# Patient Record
Sex: MALE | Race: WHITE | NOT HISPANIC OR LATINO | Employment: FULL TIME | URBAN - METROPOLITAN AREA
[De-identification: names, ages, dates, MRNs, and addresses within clinical notes are randomized per-mention and may not be internally consistent; named-entity substitution may affect disease eponyms.]

---

## 2020-01-20 ENCOUNTER — OFFICE VISIT - RIVER FALLS (OUTPATIENT)
Dept: FAMILY MEDICINE | Facility: CLINIC | Age: 45
End: 2020-01-20

## 2020-01-20 ASSESSMENT — MIFFLIN-ST. JEOR: SCORE: 1610.19

## 2020-01-28 ENCOUNTER — OFFICE VISIT - RIVER FALLS (OUTPATIENT)
Dept: FAMILY MEDICINE | Facility: CLINIC | Age: 45
End: 2020-01-28

## 2020-01-28 ASSESSMENT — MIFFLIN-ST. JEOR: SCORE: 1611.1

## 2020-06-23 ENCOUNTER — OFFICE VISIT - RIVER FALLS (OUTPATIENT)
Dept: FAMILY MEDICINE | Facility: CLINIC | Age: 45
End: 2020-06-23

## 2020-06-23 ASSESSMENT — MIFFLIN-ST. JEOR: SCORE: 1616.54

## 2021-02-18 ENCOUNTER — AMBULATORY - RIVER FALLS (OUTPATIENT)
Dept: FAMILY MEDICINE | Facility: CLINIC | Age: 46
End: 2021-02-18

## 2021-02-22 LAB — SARS-COV-2 RNA RESP QL NAA+PROBE: POSITIVE

## 2021-03-03 ENCOUNTER — OFFICE VISIT - RIVER FALLS (OUTPATIENT)
Dept: FAMILY MEDICINE | Facility: CLINIC | Age: 46
End: 2021-03-03

## 2021-03-03 ASSESSMENT — MIFFLIN-ST. JEOR: SCORE: 1611.79

## 2021-03-04 ENCOUNTER — AMBULATORY - RIVER FALLS (OUTPATIENT)
Dept: FAMILY MEDICINE | Facility: CLINIC | Age: 46
End: 2021-03-04

## 2021-03-04 LAB
BUN SERPL-MCNC: 15 MG/DL (ref 7–25)
BUN/CREAT RATIO - HISTORICAL: ABNORMAL (ref 6–22)
CALCIUM SERPL-MCNC: 9.6 MG/DL (ref 8.6–10.3)
CHLORIDE BLD-SCNC: 105 MMOL/L (ref 98–110)
CHOLEST SERPL-MCNC: 262 MG/DL
CHOLEST/HDLC SERPL: 6.1 {RATIO}
CO2 SERPL-SCNC: 24 MMOL/L (ref 20–32)
CREAT SERPL-MCNC: 1.18 MG/DL (ref 0.6–1.35)
EGFRCR SERPLBLD CKD-EPI 2021: 74 ML/MIN/1.73M2
GLUCOSE BLD-MCNC: 107 MG/DL (ref 65–99)
HDLC SERPL-MCNC: 43 MG/DL
LDLC SERPL CALC-MCNC: ABNORMAL MG/DL
NONHDLC SERPL-MCNC: 219 MG/DL
POTASSIUM BLD-SCNC: 3.9 MMOL/L (ref 3.5–5.3)
SODIUM SERPL-SCNC: 138 MMOL/L (ref 135–146)
TRIGL SERPL-MCNC: 418 MG/DL

## 2021-03-05 ENCOUNTER — COMMUNICATION - RIVER FALLS (OUTPATIENT)
Dept: FAMILY MEDICINE | Facility: CLINIC | Age: 46
End: 2021-03-05

## 2021-03-05 LAB
ERYTHROCYTE [DISTWIDTH] IN BLOOD BY AUTOMATED COUNT: 12.7 % (ref 11–15)
HCT VFR BLD AUTO: 38.4 % (ref 38.5–50)
HGB BLD-MCNC: 13.4 GM/DL (ref 13.2–17.1)
MCH RBC QN AUTO: 29.3 PG (ref 27–33)
MCHC RBC AUTO-ENTMCNC: 34.9 GM/DL (ref 32–36)
MCV RBC AUTO: 83.8 FL (ref 80–100)
PLATELET # BLD AUTO: 287 10*3/UL (ref 140–400)
PMV BLD: 11.2 FL (ref 7.5–12.5)
RBC # BLD AUTO: 4.58 10*6/UL (ref 4.2–5.8)
WBC # BLD AUTO: 7.3 10*3/UL (ref 3.8–10.8)

## 2021-07-29 ENCOUNTER — AMBULATORY - RIVER FALLS (OUTPATIENT)
Dept: FAMILY MEDICINE | Facility: CLINIC | Age: 46
End: 2021-07-29

## 2021-08-26 ENCOUNTER — AMBULATORY - RIVER FALLS (OUTPATIENT)
Dept: FAMILY MEDICINE | Facility: CLINIC | Age: 46
End: 2021-08-26

## 2022-02-11 VITALS
BODY MASS INDEX: 28.12 KG/M2 | DIASTOLIC BLOOD PRESSURE: 72 MMHG | WEIGHT: 175 LBS | HEART RATE: 66 BPM | SYSTOLIC BLOOD PRESSURE: 132 MMHG | HEIGHT: 66 IN

## 2022-02-12 VITALS
DIASTOLIC BLOOD PRESSURE: 98 MMHG | HEART RATE: 91 BPM | DIASTOLIC BLOOD PRESSURE: 60 MMHG | WEIGHT: 173.8 LBS | HEIGHT: 66 IN | HEIGHT: 66 IN | SYSTOLIC BLOOD PRESSURE: 112 MMHG | BODY MASS INDEX: 27.9 KG/M2 | WEIGHT: 173.6 LBS | HEART RATE: 74 BPM | BODY MASS INDEX: 27.93 KG/M2 | SYSTOLIC BLOOD PRESSURE: 150 MMHG | OXYGEN SATURATION: 98 %

## 2022-02-12 VITALS
DIASTOLIC BLOOD PRESSURE: 84 MMHG | SYSTOLIC BLOOD PRESSURE: 138 MMHG | HEIGHT: 65 IN | BODY MASS INDEX: 29.85 KG/M2 | OXYGEN SATURATION: 99 % | RESPIRATION RATE: 16 BRPM | WEIGHT: 179.2 LBS | HEART RATE: 83 BPM

## 2022-02-15 NOTE — TELEPHONE ENCOUNTER
---------------------  From: Adelia Rogers   To: Mau Salvador PA-C; Phone Messages Pool ([id number]_[place] - [place]);     Sent: 2/20/2021 10:42:25 AM CST  Subject: Result: COVID-19 (POSITIVE)     Left message for patient at: 10:41am, regarding COVID-19 test.  Result is POSITIVE.LMTCB at 9:29amCall to patient. Advised his result for Covid is positive. He will need to follow isolation precautions and he will be contacted by his public health office. He tells me he did just get off the phone with public health.

## 2022-02-15 NOTE — TELEPHONE ENCOUNTER
---------------------  From: Mau Salvador PA-C   To: Care Coordinators Pool ([facility]);     Sent: 2/18/2021 11:00:42 AM CST  Subject: Soraya     Please put on today's C-19 testing schedule

## 2022-02-15 NOTE — NURSING NOTE
Comprehensive Intake Entered On:  1/28/2020 5:01 PM CST    Performed On:  1/28/2020 4:58 PM CST by Yu Alvarado CMA               Summary   Chief Complaint :   low back pain; back spasms started last night; Chiropactior did help some;    Menstrual Status :   N/A   Weight Measured :   173.8 lb(Converted to: 173 lb 13 oz, 78.83 kg)    Height Measured :   66 in(Converted to: 5 ft 6 in, 167.64 cm)    Body Mass Index :   28.05 kg/m2 (HI)    Body Surface Area :   1.91 m2   Systolic Blood Pressure :   112 mmHg   Diastolic Blood Pressure :   60 mmHg   Mean Arterial Pressure :   77 mmHg   Peripheral Pulse Rate :   91 bpm   BP Method :   Manual   HR Method :   Electronic   Yu Alvarado CMA - 1/28/2020 4:58 PM CST   Health Status   Allergies Verified? :   Yes   Medication History Verified? :   Yes   Immunizations Current :   Unknown   Medical History Verified? :   Yes   Pre-Visit Planning Status :   Completed   Tobacco Use? :   Former smoker   Yu Alvarado CMA - 1/28/2020 4:58 PM CST   Consents   Consent for Immunization Exchange :   Consent Granted   Consent for Immunizations to Providers :   Consent Granted   Yu Alvarado CMA - 1/28/2020 4:58 PM CST   Meds / Allergies   (As Of: 1/28/2020 5:01:27 PM CST)   Allergies (Active)   No Known Medication Allergies  Estimated Onset Date:   Unspecified ; Created By:   Jennyfer Stephenson CMA; Reaction Status:   Active ; Category:   Drug ; Substance:   No Known Medication Allergies ; Type:   Allergy ; Updated By:   Jennyfer Stephenson CMA; Reviewed Date:   1/28/2020 5:00 PM CST        Medication List   (As Of: 1/28/2020 5:01:27 PM CST)   Prescription/Discharge Order    cyclobenzaprine  :   cyclobenzaprine ; Status:   Processing ; Ordered As Mnemonic:   cyclobenzaprine 10 mg oral tablet ; Ordering Provider:   Mau Salvador PA-C; Action Display:   Complete ; Catalog Code:   cyclobenzaprine ; Order Dt/Tm:   1/28/2020 5:00:15 PM CST

## 2022-02-15 NOTE — PROGRESS NOTES
Chief Complaint    low back pain; back spasms started last night; Chiropactior did help some;  History of Present Illness      patient with back pain x 2 weeks      started after episode of prolonged standing and then having to shovel      had seemed to be improving but now worsening again      seeing chiropractor daily since this started      pain noted to be spasm, bilateral lower back, radiates up toward thoracic/midback      no leg symptoms, no bowel or bladder issues, no radiation         Physical Exam   Vitals & Measurements    HR: 91(Peripheral)  BP: 112/60     HT: 66 in  WT: 173.8 lb  BMI: 28.05       patient is alert and oriented, cooperative, in no distress      heent: normocephalic, oral mucosa moist      neck: supple      MSK: no spine tenderness, pain in lateral low back bilaterally, no deformity      normal reflexes in lower extremities  Assessment/Plan       Lumbago (M54.5)         Patient has symptoms consistent with musculoskeletal back pain. Doubt fracture. No evidence of any high risk symptoms. Discussed options for treatment including NSAIDs, ice, heat, muscle relaxers and steroids. Also can consider PT or massage.  Will continue chiropractic care. Off work this week.         Ordered:          cyclobenzaprine, = 1 tab(s) ( 10 mg ), Oral, tid, PRN: for spasm, # 30 tab(s), 0 Refill(s), Type: Acute, Pharmacy: [facility] #[id number], 1 tab(s) Oral tid,PRN:for spasm, (Ordered)          predniSONE, = 2 tab(s) ( 40 mg ), Oral, daily, x 5 day(s), # 10 tab(s), 0 Refill(s), Type: Acute, Pharmacy: [facility] #[id number], 2 tab(s) Oral daily,x5 day(s), (Ordered)           Patient Information     Name:DAISY HELLER      Address:      [address] [place] [id number]     Sex:Male     YOB: 1975     [phone]     Emergency Contact:[facility], CONTACT     MRN:[id number]     FIN:[id number]     Location:[facility]     Date of  Service:01/28/2020      Primary Care Physician:       Marta Sanz MD, [phone]      Attending Physician:       Marta Sanz MD, [phone]  Problem List/Past Medical History    Ongoing     No qualifying data    Historical     No qualifying data  Procedure/Surgical History     L knee surgery (2011)     R knee surgery (1993)  Medications    cyclobenzaprine 10 mg oral tablet, 10 mg= 1 tab(s), Oral, tid, PRN    predniSONE 20 mg oral tablet, 40 mg= 2 tab(s), Oral, daily  Allergies    No Known Medication Allergies  Social History    Smoking Status - 01/28/2020     Former smoker     Alcohol - Current, 01/28/2014      2 x's per week, 4 drinks/episode average., 01/28/2014     Employment/School      Employed, Work/School description: [employer]., 01/03/2014     Exercise - Regular exercise, 01/03/2014      Exercise frequency: 1-2 times/week. Exercise type: Cardio and weights., 01/28/2014     Home/Environment      Marital status:  (Living together). Spouse/Partner name: Alexsandra. Lives with Children, Spouse. 3 children. Living situation: Home/Independent. Smoker in household: No., 11/13/2014     Nutrition/Health      Type of diet: Regular. Caffeine intake amount: Red Bull in am., 11/13/2014     Sexual      Sexually active: Yes. Sexual orientation: Heterosexual. Other contraceptive use: Vasectomy., 01/28/2014     Substance Abuse - Denies Substance Abuse, 01/28/2014      Never, 01/03/2014     Tobacco - Past, 01/28/2014      Past, Total pack years: 10., 01/28/2014  Family History    Diabetes mellitus type 2: Grandmother (M).    Sister: History is negative    Sister: History is negative    Sister: History is negative    Mother: History is negative  Immunizations      Vaccine Date Status          tetanus/diphth/pertuss (Tdap) adult/adol 12/02/2014 Given

## 2022-02-15 NOTE — LETTER
(Inserted Image. Unable to display)   January 21, 2021      MAU HELLER  [address], [place] [id number]        Dear MAU,      Thank you for selecting [facility] (previously [facility]) for your healthcare needs.     Our records indicate you are due for the following services:     Annual Physical    (FYI   Regarding office visits: In some instances, a video visit or telephone visit may be offered as an option.)      To schedule an appointment or if you have further questions, please contact your clinic at [phone].      Powered by [employer]    Sincerely,    Mau Salvador PA-C

## 2022-02-15 NOTE — PROGRESS NOTES
Patient:   DAISY HELLER            MRN: [id number]            FIN: [id number]               Age:   44 years     Sex:  Male     :  1975   Associated Diagnoses:   Low back pain   Author:   Marta Sanz MD      Chief Complaint   2020 3:12 PM CDT    low back pain, chiropactor helps some, no injury      History of Present Illness   patient with low back pain, left more than right  using iburpofen, seeing chiropractor  minimally better  has had recurrent episodes, prednisone was helpful, also used muscle relaxers  no trauma or injury  no weakness, no bowel or bladder symptoms      Health Status   Allergies:    Allergic Reactions (All)  No Known Medication Allergies  Canceled/Inactive Reactions (All)  No known allergies   Medications:    Medications          No Known Home Medications        Histories   Past Medical History:    No active or resolved past medical history items have been selected or recorded.   Family History:    CA - Lung cancer  Father ()  Diabetes mellitus type 2  Grandmother (M)     Procedure history:    L knee surgery in  at 36 Years.  R knee surgery in  at 18 Years.   Social History:        Alcohol Assessment: Current            Beer (12 oz), 3-5 times per week, 2 drinks/episode average.  3 drinks/episode maximum.  Ready to change: No.      Tobacco Assessment: Past            Past, Total pack years: 10.            Quit 2010, Cigarettes, Total pack years: 10.      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      Home and Environment Assessment            Marital status:  (Living together).  Spouse/Partner name: Alexsandra.  Lives with Children, Spouse.  3               children.  Living situation: Home/Independent.  Smoker in household: No.  Injuries/Abuse/Neglect in               household: No.  Feels unsafe at home: No.  Family/Friends  available for support: Yes.      Nutrition and Health Assessment            Type of diet: Regular.  Caffeine intake amount: Red Bull in am.  Wants to lose weight: No.  Sleeping               concerns: No.  Feels highly stressed: No.      Exercise and Physical Activity Assessment: Regular exercise            Exercise frequency: 1-2 times/week.  Exercise type: Cardio and weights.      Sexual Assessment            Sexually active: Yes.  Sexual orientation: Heterosexual.  Other contraceptive use: Vasectomy.            Identifies as male, History of STD: No.  History of sexual abuse: No.        Physical Examination   Vital Signs   6/23/2020 3:12 PM CDT Peripheral Pulse Rate 66 bpm    Systolic Blood Pressure 132 mmHg    Diastolic Blood Pressure 72 mmHg    Mean Arterial Pressure 92 mmHg      Measurements from flowsheet : Measurements   6/23/2020 3:12 PM CDT Height Measured - Standard 66 in    Weight Measured - Standard 175 lb    BSA 1.92 m2    Body Mass Index 28.24 kg/m2  HI      General:  Alert and oriented, No acute distress.    no bony tenderness along lumbar spine, tender in paraspinous muscles         Impression and Plan   Diagnosis     Low back pain ([id number] M54.5).     Course:  Worsening, acute exacerbation, will continue chiropractic care, add prednisone and muscle relaxers, start PT if not improving.    Orders     Orders (Selected)   Outpatient Orders  Ordered  Referral (Request): 06/23/20 15:23:00 CDT, Referred to: Physical Therapy, Low back pain  Prescriptions  Prescribed  cyclobenzaprine 10 mg oral tablet: = 1 tab(s) ( 10 mg ), Oral, tid, PRN: for spasm, # 30 tab(s), 0 Refill(s), Type: Acute, Pharmacy: [facility] #[id number], 1 tab(s) Oral tid,PRN:for spasm, 66, in, 06/23/20 15:12:00 CDT, Height Measured, 175, lb, 06/23/20 15:12:00 CDT, Weight Paulina...  predniSONE 20 mg oral tablet: = 2 tab(s) ( 40 mg ), Oral, daily, x 5 day(s), # 10 tab(s), 0 Refill(s), Type: Acute, Pharmacy: [facility] #[id number],  2 tab(s) Oral daily,x5 day(s), 66, in, 06/23/20 15:12:00 CDT, Height Measured, 175, lb, 06/23/20 15:12:00 CDT, Weight Measured....

## 2022-02-15 NOTE — NURSING NOTE
Comprehensive Intake Entered On:  6/23/2020 3:15 PM CDT    Performed On:  6/23/2020 3:12 PM CDT by Yu Alvarado CMA               Summary   Chief Complaint :   low back pain, chiropactor helps some, no injury    Menstrual Status :   N/A   Weight Measured :   175 lb(Converted to: 175 lb 0 oz, 79.38 kg)    Height Measured :   66 in(Converted to: 5 ft 6 in, 167.64 cm)    Body Mass Index :   28.24 kg/m2 (HI)    Body Surface Area :   1.92 m2   Systolic Blood Pressure :   132 mmHg   Diastolic Blood Pressure :   72 mmHg   Mean Arterial Pressure :   92 mmHg   Peripheral Pulse Rate :   66 bpm   Yu Alvarado CMA - 6/23/2020 3:12 PM CDT   Health Status   Allergies Verified? :   Yes   Medication History Verified? :   Yes   Immunizations Current :   Unknown   Medical History Verified? :   Yes   Pre-Visit Planning Status :   Completed   Tobacco Use? :   Never smoker   Yu Alvarado CMA - 6/23/2020 3:12 PM CDT   Consents   Consent for Immunization Exchange :   Consent Granted   Consent for Immunizations to Providers :   Consent Granted   Yu Alvarado CMA - 6/23/2020 3:12 PM CDT   Meds / Allergies   (As Of: 6/23/2020 3:15:59 PM CDT)   Allergies (Active)   No Known Medication Allergies  Estimated Onset Date:   Unspecified ; Created By:   Jennyfer Stephenson CMA; Reaction Status:   Active ; Category:   Drug ; Substance:   No Known Medication Allergies ; Type:   Allergy ; Updated By:   Jennyfer Stephenson CMA; Reviewed Date:   6/23/2020 3:14 PM CDT        Medication List   (As Of: 6/23/2020 3:15:59 PM CDT)   No Known Home Medications     Yu Alvarado CMA - 6/23/2020 3:14:20 PM           ID Risk Screen   Recent Travel History :   No recent travel   Family Member Travel History :   No recent travel   Other Exposure to Infectious Disease :   Unknown   Yu Alvarado CMA - 6/23/2020 3:12 PM CDT

## 2022-02-15 NOTE — NURSING NOTE
Comprehensive Intake Entered On:  3/3/2021 2:14 PM CST    Performed On:  3/3/2021 2:07 PM CST by Rosa Lawrence               Summary   Chief Complaint :   Physical     Menstrual Status :   N/A   Weight Measured :   179.2 lb(Converted to: 179 lb 3 oz, 81.284 kg)    Height Measured :   64.5 in(Converted to: 5 ft 4 in, 163.83 cm)    Body Mass Index :   30.28 kg/m2 (HI)    Body Surface Area :   1.92 m2   Systolic Blood Pressure :   138 mmHg (HI)    Diastolic Blood Pressure :   84 mmHg (HI)    Mean Arterial Pressure :   102 mmHg   Peripheral Pulse Rate :   83 bpm   Respiratory Rate :   16 br/min   Oxygen Saturation :   99 %   Rosa Lawrence - 3/3/2021 2:07 PM CST   Health Status   Allergies Verified? :   Yes   Medication History Verified? :   Yes   Immunizations Current :   Unknown   Pre-Visit Planning Status :   Completed   Tobacco Use? :   Former smoker   Rosa Lawrence - 3/3/2021 2:07 PM CST   Consents   Consent for Immunization Exchange :   Consent Granted   Consent for Immunizations to Providers :   Consent Granted   Rosa Lawrence - 3/3/2021 2:07 PM CST   Meds / Allergies   (As Of: 3/3/2021 2:14:54 PM CST)   Allergies (Active)   No Known Medication Allergies  Estimated Onset Date:   Unspecified ; Created By:   Jennyfer Stephenson CMA; Reaction Status:   Active ; Category:   Drug ; Substance:   No Known Medication Allergies ; Type:   Allergy ; Updated By:   Jennyfer Stephenson CMA; Reviewed Date:   3/3/2021 2:10 PM CST        Medication List   (As Of: 3/3/2021 2:14:54 PM CST)   No Known Home Medications     Rosa Lawrence - 3/3/2021 2:10:47 PM           ID Risk Screen   Recent Travel History :   No recent travel   Family Member Travel History :   No recent travel   Other Exposure to Infectious Disease :   Unknown   COVID-19 Testing Status :   Positive COVID-19 test in the last 30 days   Rosa Lawrence - 3/3/2021 2:07 PM CST   Social History   Social History   (As Of: 3/3/2021 2:14:54 PM CST)   Alcohol:  Current      Beer  (12 oz), 3-5 times per week, 2 drinks/episode average.  3 drinks/episode maximum.  Ready to change: No.   (Last Updated: 2/19/2020 2:35:21 PM CST by Yari Burleson)          Tobacco:  Past      Former smoker, quit more than 30 days ago   (Last Updated: 3/3/2021 2:08:36 PM CST by Rosa Lawrence)          Electronic Cigarette/Vaping:        Electronic Cigarette Use: Never.   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   (Last Updated: 1/3/2014 3:35:45 PM CST by Angela Curtis LPN)          Home/Environment:        Marital status:  (Living together).  Spouse/Partner name: Alexsandra.  Lives with Children, Spouse.  3 children.  Living situation: Home/Independent.  Smoker in household: No.  Injuries/Abuse/Neglect in household: No.  Feels unsafe at home: No.  Family/Friends available for support: Yes.   (Last Updated: 2/19/2020 2:35:54 PM CST by Yari Burleson)          Nutrition/Health:        Type of diet: Regular.  Caffeine intake amount: Red Bull in am.  Wants to lose weight: No.  Sleeping concerns: No.  Feels highly stressed: No.   (Last Updated: 2/19/2020 2:35:36 PM CST by Yari Burleson)          Exercise:  Regular exercise      Exercise frequency: 1-2 times/week.  Exercise type: Cardio and weights.   (Last Updated: 1/28/2014 12:17:47 PM CST by Yari Burleson)          Sexual:        Sexually active: Yes.  Sexual orientation: Heterosexual.  Other contraceptive use: Vasectomy.   (Last Updated: 1/28/2014 12:17:17 PM CST by Yari Burleson)   Identifies as male, History of STD: No.  History of sexual abuse: No.   (Last Updated: 2/19/2020 2:36:17 PM CST by Yari Burleson)

## 2022-02-15 NOTE — TELEPHONE ENCOUNTER
---------------------  From: Mireille Thompson (Phone Messages Pool ([id number]_[facility]))   To: [facility] Message Pool ([address]_[facility]);     Sent: 2/18/2021 10:24:41 AM CST  Subject: General Message     Phone Message    PCP: CASSANDRA    Time of Call: 0945    Phone Number: [phone]    Returned call at: [phone]    Note: Pt called stated that son tested pos for COVID and he is wanting to get tested for covid as well.    Do you want pt to schedule a visit or ok to order test?---------------------  From: Maribeth Salazar MA ([employer] Message Pool ([id number]_[facility]))   To: Mau Salvador PA-C;     Sent: 2/18/2021 10:26:40 AM CST  Subject: FW: General Message

## 2022-02-15 NOTE — PROGRESS NOTES
Patient:   MAU HELLER            MRN: [id number]            FIN: [id number]               Age:   44 years     Sex:  Male     :  1975   Associated Diagnoses:   Low back pain   Author:   Madeleine DICKINSON, Mau      Report Summary   Diagnosis  Low back pain ([id number]-HA M54.5).  Patient InstructionsOrders   Visit Information      Date of Service: 2020 10:14 am  Performing Location: [facility]  Encounter#: [id number]      Primary Care Provider (PCP):  Marta Sanz MD    NPI# [id number]   Visit type:  Increase in symptoms.    Accompanied by:  Spouse.    Source of history:  Self, Spouse.    Referral source:  Self.    History limitation:  None.       Chief Complaint   2020 10:20 AM CST   New Patient: c/o low back pain since Friday; went to visitiation stood in line for 2 hrs back started to hurt,, has been getting worse; IBU not helping; hx of chronic back pain        History of Present Illness             The patient presents with back pain.  The back pain is located on both sides and lumbar region.  The back pain is described as aching and spasmodic.  The severity of the back pain is moderate.  The back pain is episodic, fluctuates in intensity and is worsening.  The back pain has lasted for 3 day(s).  Radiation of pain: none.  Exacerbating factors consist of movement and walking.  Relieving factors consist of none.  Associated symptoms consist of none.  History of low back pain. Sees chiropractor. Using Advil with no relief. PDMP checked..        Review of Systems   Constitutional:  Negative.    Gastrointestinal:  Negative.    Genitourinary:  Negative.    Musculoskeletal:  Negative except as documented in history of present illness.    Integumentary:  Negative.    Neurologic:  Negative.       Health Status   Allergies:    Allergic Reactions (All)  No Known Medication Allergies  Canceled/Inactive Reactions (All)  No known allergies   Medications:  (Selected)    Prescriptions  Prescribed  cyclobenzaprine 10 mg oral tablet: = 1 tab(s) ( 10 mg ), Oral, tid, PRN: for spasm, # 30 tab(s), 0 Refill(s), Type: Acute, Pharmacy: [facility] #[id number], 1 tab(s) Oral tid,PRN:for spasm  traMADol 50 mg oral tablet: = 1 tab(s) ( 50 mg ), Oral, q4-6 hrs, x 3 day(s), PRN: for pain, # 18 tab(s), 0 Refill(s), Type: Acute, Pharmacy: [facility] #[id number], 1 tab(s) Oral q4-6 hrs,x3 day(s),PRN:for pain   Problem list:    No problem items selected or recorded.      Histories   Past Medical History:    No active or resolved past medical history items have been selected or recorded.   Family History:    Diabetes mellitus type 2  Grandmother (M)     Procedure history:    L knee surgery in 2011 at 36 Years.  R knee surgery in 1993 at 18 Years.   Social History:        Alcohol Assessment: Current            2 x's per week, 4 drinks/episode average.      Tobacco Assessment: Past            Past, Total pack years: 10.                     Comments:                      01/03/2014 - Kirsten SAMPSON, Angela                     Quit 6 yrs ago-cigs.      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      Home and Environment Assessment            Marital status:  (Living together).  Spouse/Partner name: Alexsandra.  Lives with Children, Spouse.  3               children.  Living situation: Home/Independent.  Smoker in household: No.      Nutrition and Health Assessment            Type of diet: Regular.  Caffeine intake amount: Red Bull in am.      Exercise and Physical Activity Assessment: Regular exercise            Exercise frequency: 1-2 times/week.  Exercise type: Cardio and weights.      Sexual Assessment            Sexually active: Yes.  Sexual orientation: Heterosexual.  Other contraceptive use: Vasectomy.        Physical Examination   Vital Signs   1/20/2020  10:20 AM CST Peripheral Pulse Rate 74 bpm    HR Method Electronic    Systolic Blood Pressure 150 mmHg  HI    Diastolic Blood Pressure 98 mmHg  HI    Mean Arterial Pressure 115 mmHg    BP Site Right arm    BP Method Manual    Oxygen Saturation 98 %      Measurements from flowsheet : Measurements   1/20/2020 10:20 AM CST Height Measured - Standard 66 in    Weight Measured - Standard 173.6 lb    BSA 1.91 m2    Body Mass Index 28.02 kg/m2  HI      General:  Alert and oriented, Mild distress.    Respiratory:  Respirations are non-labored.    Cardiovascular:  Normal rate.    Genitourinary:  No costovertebral angle tenderness.    Musculoskeletal:  Normal strength, No swelling, Normal gait.    Integumentary:  No rash.    Neurologic:  Alert, No focal deficits, Normal deep tendon reflexes.    Psychiatric:  Cooperative, Appropriate mood & affect.       Impression and Plan   Diagnosis     Low back pain (GRV21-HK M54.5).     Patient Instructions:       Counseled: Patient, Regarding diagnosis, Regarding medications, Activity, Verbalized understanding.    Orders     Orders (Selected)   Prescriptions  Prescribed  cyclobenzaprine 10 mg oral tablet: = 1 tab(s) ( 10 mg ), Oral, tid, PRN: for spasm, # 30 tab(s), 0 Refill(s), Type: Acute, Pharmacy: [facility] #[id number], 1 tab(s) Oral tid,PRN:for spasm  traMADol 50 mg oral tablet: = 1 tab(s) ( 50 mg ), Oral, q4-6 hrs, x 3 day(s), PRN: for pain, # 18 tab(s), 0 Refill(s), Type: Acute, Pharmacy: [facility] #[id number], 1 tab(s) Oral q4-6 hrs,x3 day(s),PRN:for pain.     Chiropractor. HEP. Light duty. FU in two weeks if no progress, or prior if worse.

## 2022-02-15 NOTE — NURSING NOTE
Comprehensive Intake Entered On:  1/20/2020 10:30 AM CST    Performed On:  1/20/2020 10:20 AM CST by Jennyefr Stephenson CMA               Summary   Chief Complaint :   New Patient: c/o low back pain since Friday; went to visitiation stood in line for 2 hrs back started to hurt,, has been getting worse; IBU not helping; hx of chronic back pain   Weight Measured :   173.6 lb(Converted to: 173 lb 10 oz, 78.74 kg)    Height Measured :   66 in(Converted to: 5 ft 6 in, 167.64 cm)    Body Mass Index :   28.02 kg/m2 (HI)    Body Surface Area :   1.91 m2   Systolic Blood Pressure :   150 mmHg (HI)    Diastolic Blood Pressure :   98 mmHg (HI)    Mean Arterial Pressure :   115 mmHg   Peripheral Pulse Rate :   74 bpm   BP Site :   Right arm   BP Method :   Manual   HR Method :   Electronic   Oxygen Saturation :   98 %   Jennyfer Stephenson CMA - 1/20/2020 10:20 AM CST   Health Status   Allergies Verified? :   Yes   Medication History Verified? :   Yes   Immunizations Current :   Unknown   Medical History Verified? :   Yes   Tobacco Use? :   Former smoker   Jennyfer Stephenson CMA - 1/20/2020 10:20 AM CST   Consents   Consent for Immunization Exchange :   Consent Granted   Consent for Immunizations to Providers :   Consent Granted   Jennyfer Stephenson CMA - 1/20/2020 10:20 AM CST   Meds / Allergies   (As Of: 1/20/2020 10:30:04 AM CST)   Allergies (Active)   No Known Medication Allergies  Estimated Onset Date:   Unspecified ; Created By:   Jennyfer Stephenson CMA; Reaction Status:   Active ; Category:   Drug ; Substance:   No Known Medication Allergies ; Type:   Allergy ; Updated By:   Jennyfer Stephenson CMA; Reviewed Date:   1/20/2020 10:27 AM CST        Medication List   (As Of: 1/20/2020 10:30:04 AM CST)   No Known Home Medications     Jennyfer Stephenson CMA - 1/20/2020 10:28:02 AM           Social History   Social History   (As Of: 1/20/2020 10:30:04 AM CST)   Alcohol:  Current      2 x's per week, 4 drinks/episode average.   (Last Updated: 1/28/2014 12:19:24 PM  CST by Yari Burleson)          Tobacco:  Past      Past, Total pack years: 10.   Comments:  1/3/2014 3:35 PM - Angela Curtis LPN: Quit 6 yrs ago-cigs.   (Last Updated: 1/28/2014 12:18:59 PM CST by Yari Burleson)          Substance Abuse:  Denies Substance Abuse      Never   (Last Updated: 1/3/2014 3:35:30 PM CST by Angela Curtis LPN)          Employment/School:        Employed, Work/School description: Warehouse planner.   (Last Updated: 1/3/2014 3:35:45 PM CST by Angela Curtis LPN)          Home/Environment:        Marital status:  (Living together).  Spouse/Partner name: Alexsandra.  Lives with Children, Spouse.  3 children.  Living situation: Home/Independent.  Smoker in household: No.   (Last Updated: 11/13/2014 3:03:51 PM CST by Tatianna Moseley CMA)          Nutrition/Health:        Type of diet: Regular.  Caffeine intake amount: Red Bull in am.   (Last Updated: 11/13/2014 3:04:05 PM CST by Tatianna Moseley CMA)          Exercise:  Regular exercise      Exercise frequency: 1-2 times/week.  Exercise type: Cardio and weights.   (Last Updated: 1/28/2014 12:17:47 PM CST by Yari Burleson)          Sexual:        Sexually active: Yes.  Sexual orientation: Heterosexual.  Other contraceptive use: Vasectomy.   (Last Updated: 1/28/2014 12:17:17 PM CST by Yari Burleson)

## 2022-02-15 NOTE — TELEPHONE ENCOUNTER
---------------------  From: Maia Pace LPN (Phone Messages Pool ([id number]_[facility]))   To: [facility] Message Pool ([id number]_[facility]);     Sent: 3/19/2021 1:53:18 PM CDT  Subject: labs     Phone Message    PCP:   [facility]      Time of Call:  1:49pm       Person Calling:  pt  Phone number:  [phone]    Note:   Pt calling stating he was in over 2 weeks ago for labs and has not received call with results.  Returned call and informed pt results were sent to portal. Read pt results.    Per results pt suppose to call [facility] to discuss .    Last office visit and reason:  3/3/21 well adult exam- male---------------------  From: Jennyfer Stephenson CMA ([employer] Message Pool ([id number]))   To: Mau Salvador PA-C;     Sent: 3/19/2021 1:57:35 PM CDT  Subject: FW: labs---------------------  From: Mau Salvador PA-C   To: [employer] Message Pool ([id number]_[facility]);     Sent: 3/19/2021 2:14:45 PM CDT  Subject: RE: labs     I did call him. Will work on lifestyle modifications. FU with fasting labs in one year.      [facility]

## 2022-02-15 NOTE — TELEPHONE ENCOUNTER
---------------------  From: Mau Salvador PA-C   To: MAU HELLER    Sent: 3/5/2021 6:55:57 AM CST  Subject: General Message     Your glucose is mildly elevated. Your lipids are more elevated than I would like. Please call me the week of March 15th to discuss in detail.       Results:  Date Result Name Ind Value Ref Range   3/3/2021 2:25 PM Sodium Level  138 mmol/L (135 - 146)   3/3/2021 2:25 PM Potassium Level  3.9 mmol/L (3.5 - 5.3)   3/3/2021 2:25 PM Chloride Level  105 mmol/L (98 - 110)   3/3/2021 2:25 PM CO2 Level  24 mmol/L (20 - 32)   3/3/2021 2:25 PM Glucose Level ((H)) 107 mg/dL (65 - 99)   3/3/2021 2:25 PM BUN  15 mg/dL (7 - 25)   3/3/2021 2:25 PM Creatinine Level  1.18 mg/dL (0.60 - 1.35)   3/3/2021 2:25 PM Calcium Level  9.6 mg/dL (8.6 - 10.3)   3/3/2021 2:25 PM Cholesterol ((H)) 262 mg/dL ( - <200)   3/3/2021 2:25 PM Non-HDL Cholesterol ((H)) 219 ( - <130)   3/3/2021 2:25 PM HDL  43 mg/dL (> OR = 40 - )   3/3/2021 2:25 PM Cholesterol/HDL Ratio ((H)) 6.1 ( - <5.0)   3/3/2021 2:25 PM LDL  See comment    3/3/2021 2:25 PM LDL Direct ((H)) 167 mg/dL ( - <100)   3/3/2021 2:25 PM Triglyceride ((H)) 418 mg/dL ( - <150)   3/4/2021 3:36 PM WBC  7.3 (3.8 - 10.8)   3/4/2021 3:36 PM RBC  4.58 (4.20 - 5.80)   3/4/2021 3:36 PM Hgb  13.4 gm/dL (13.2 - 17.1)   3/4/2021 3:36 PM Hct ((L)) 38.4 % (38.5 - 50.0)   3/4/2021 3:36 PM MCV  83.8 fL (80.0 - 100.0)   3/4/2021 3:36 PM MCH  29.3 pg (27.0 - 33.0)   3/4/2021 3:36 PM MCHC  34.9 gm/dL (32.0 - 36.0)   3/4/2021 3:36 PM RDW  12.7 % (11.0 - 15.0)   3/4/2021 3:36 PM Platelet  287 (140 - 400)   3/4/2021 3:36 PM MPV  11.2 fL (7.5 - 12.5)

## 2022-02-15 NOTE — NURSING NOTE
CAGE Assessment Entered On:  3/3/2021 3:08 PM CST    Performed On:  3/3/2021 3:07 PM CST by Rosa Lawrence               Assessment   Have you ever felt you should cut down on your drinking :   No   Have people annoyed you by criticizing your drinking :   No   Have you ever felt bad or guilty about your drinking :   No   Have you ever taken a drink first thing in the morning to steady your nerves or get rid of a hangover (Eye-opener) :   No   CAGE Score :   0    Rosa Lawrence - 3/3/2021 3:07 PM CST

## 2022-02-15 NOTE — PROGRESS NOTES
Patient:   MAU HELLER            MRN: [id number]            FIN: [id number]               Age:   45 years     Sex:  Male     :  1975   Associated Diagnoses:   Annual physical exam; Screening cholesterol level; Obesity   Author:   Mau Salvador PA-C      Visit Information      Date of Service: 2021 01:42 pm  Performing Location: [facility]  Encounter#: [id number]      Primary Care Provider (PCP):  Mau Salvador PA-C    NPI# [id number]      Referring Provider:  Mau Salvador PA-C# [id number]   Visit type:  Annual exam.    Accompanied by:  No one.    Source of history:  Medical record.    Referral source:  Self.    History limitation:  None.       Chief Complaint   3/3/2021 2:07 PM CST     Physical        Well Adult History   Well Adult History             The patient presents for well adult exam.  The patient's general health status is described as good.  The patient's diet is described as balanced.  Medical encounters: Had Covid infection. Really mild symptoms. They have all resolved. .  Additional pertinent history: daily caffeine use, tobacco use none and alcohol use socially.        Review of Systems   Constitutional:  Negative.    Eye:  Negative.    Ear/Nose/Mouth/Throat:  Negative.    Respiratory:  Negative, Does snore. No witnesses apnea.    Cardiovascular:  Negative.    Gastrointestinal:  Negative.    Genitourinary:  Negative.    Hematology/Lymphatics:  Negative.    Endocrine:  Negative.    Immunologic:  Negative.    Musculoskeletal:  Negative.    Integumentary:  Negative.    Neurologic:  Negative.    Psychiatric:  Negative.    All other systems reviewed and negative      Health Status   Allergies:    Allergic Reactions (All)  No Known Medication Allergies  Canceled/Inactive Reactions (All)  No known allergies   Medications:  (Selected)      Problem list:    All Problems  Obesity / SNOMED CT 1195825247 / Probable      Histories   Past Medical History:    No active or  resolved past medical history items have been selected or recorded.   Family History:    CA - Lung cancer  Father ()  Diabetes mellitus type 2  Grandmother (M)     Procedure history:    L knee surgery in  at 36 Years.  R knee surgery in  at 18 Years.   Social History:        Electronic Cigarette/Vaping Assessment            Electronic Cigarette Use: Never.      Alcohol Assessment: Current            Beer (12 oz), 3-5 times per week, 2 drinks/episode average.  3 drinks/episode maximum.  Ready to change: No.      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      Home and Environment Assessment            Marital status:  (Living together).  Spouse/Partner name: Alexsandra.  Lives with Children, Spouse.  3               children.  Living situation: Home/Independent.  Smoker in household: No.  Injuries/Abuse/Neglect in               household: No.  Feels unsafe at home: No.  Family/Friends available for support: Yes.      Nutrition and Health Assessment            Type of diet: Regular.  Caffeine intake amount: Red Bull in am.  Wants to lose weight: No.  Sleeping               concerns: No.  Feels highly stressed: No.      Exercise and Physical Activity Assessment: Regular exercise            Exercise frequency: 1-2 times/week.  Exercise type: Cardio and weights.      Sexual Assessment            Sexually active: Yes.  Sexual orientation: Heterosexual.  Other contraceptive use: Vasectomy.            Identifies as male, History of STD: No.  History of sexual abuse: No.        Physical Examination   Vital Signs   3/3/2021 2:07 PM CST Peripheral Pulse Rate 83 bpm    Respiratory Rate 16 br/min    Systolic Blood Pressure 138 mmHg  HI    Diastolic Blood Pressure 84 mmHg  HI    Mean Arterial Pressure  102 mmHg    Oxygen Saturation 99 %      Measurements from flowsheet : Measurements   3/3/2021 2:07 PM CST Height Measured - Standard 64.5 in    Weight Measured - Standard 179.2 lb    BSA 1.92 m2    Body Mass Index 30.28 kg/m2  HI      General:  No acute distress.    Eye:  Pupils are equal, round and reactive to light, Extraocular movements are intact, Normal conjunctiva.    HENT:  Normocephalic, Tympanic membranes are clear, Oral mucosa is moist, No pharyngeal erythema.    Neck:  Supple, Non-tender, No lymphadenopathy, No thyromegaly.    Respiratory:  Lungs are clear to auscultation, Respirations are non-labored, Breath sounds are equal.    Cardiovascular:  Normal rate, Regular rhythm, No murmur.    Gastrointestinal:  Soft, Non-tender, Non-distended, Normal bowel sounds, No organomegaly.    Genitourinary:  No costovertebral angle tenderness.    Integumentary:  No rash.    Neurologic:  No focal deficits.    Psychiatric:  Cooperative, Appropriate mood & affect.       Health Maintenance      Recommendations     Pending (in the next year)        OverDue           Influenza Vaccine due  09/01/20  and every 1  year(s)           Alcohol Misuse Screen due  01/20/21  and every 1  year(s)           Depression Screen (Male) due  01/20/21  and every 1  year(s)           Coronavirus (COVID-19) Confirmed - Initiate Quarantine due  02/19/21  and every 1  day(s)        Due            Lipid Disorders Screen (Male) due  03/03/21  and every 1  year(s)           Obesity Screen and Counseling (Male) due  03/03/21  and every 1  year(s)           Type 2 Diabetes Mellitus Screen (Male) due  03/03/21  Variable frequency     Satisfied (in the past 1 year)        Satisfied            Body Mass Index Check on  03/03/21.           Body Mass Index Check on  06/23/20.           Coronavirus (COVID-19) Confirmed - Initiate Quarantine on  02/18/21.           High Blood Pressure Screen (Male) on  03/03/21.           High Blood Pressure Screen (Male)  on  06/23/20.           Tobacco Use Screen (Male) on  03/03/21.           Tobacco Use Screen (Male) on  06/23/20.          Impression and Plan   PHQ-9 and Cage screening performed and reviewed.   Diagnosis     Annual physical exam (KEM67-BO Z00.00).     Screening cholesterol level (DIK89-EQ Z13.220).     Obesity (SJN54-CZ E66.9).     Patient Instructions:       Counseled: Patient, Regarding diagnosis, Regarding medications, Verbalized understanding.    Orders     Orders (Selected)   Outpatient Orders  Ordered (Dispatched)  Basic Metabolic Panel* ([facility]): Specimen Type: Serum, Collection Date: 03/03/21 14:20:00 CST  CBC (h/h, RBC, indices, WBC, Plt)* ([facility]): Specimen Type: Blood, Collection Date: 03/03/21 14:20:00 CST  Lipid panel with reflex to direct ldl* ([facility]): Specimen Type: Serum, Collection Date: 03/03/21 14:20:00 CST.     Summary:  RTC in one year and PRN..

## 2022-02-15 NOTE — NURSING NOTE
CAGE Assessment Entered On:  1/20/2020 11:59 AM CST    Performed On:  1/20/2020 11:59 AM CST by Jennyfer Stephenson CMA               Assessment   Have you ever felt you should cut down on your drinking :   No   Have people annoyed you by criticizing your drinking :   No   Have you ever felt bad or guilty about your drinking :   No   Have you ever taken a drink first thing in the morning to steady your nerves or get rid of a hangover (Eye-opener) :   No   CAGE Score :   0    Jennyfer Stephenson CMA - 1/20/2020 11:59 AM CST

## 2022-02-15 NOTE — PROGRESS NOTES
Seen for COVID testing at [facility] per Mau Salvador PA-C    O2 Sat = 97%  (Children under 12 do not require O2 sat)    Specimen sent to:  [facility]    PUI form faxed to: [facility].

## 2022-02-15 NOTE — NURSING NOTE
Depression Screening Entered On:  3/3/2021 3:08 PM CST    Performed On:  3/3/2021 3:07 PM CST by Rosa Lawrence               Depression Screening   Little Interest - Pleasure in Activities :   Not at all   Feeling Down, Depressed, Hopeless :   Not at all   Initial Depression Screen Score :   0 Score   Poor Appetite or Overeating :   Not at all   Trouble Falling or Staying Asleep :   Not at all   Feeling Tired or Little Energy :   Not at all   Feeling Bad About Yourself :   Not at all   Trouble Concentrating :   Not at all   Moving or Speaking Slowly :   Not at all   Thoughts Better Off Dead or Hurting Self :   Not at all   Detailed Depression Screen Score :   0    Total Depression Screen Score :   0    Rosa Lawrence - 3/3/2021 3:07 PM CST

## 2022-02-15 NOTE — NURSING NOTE
Depression Screening Entered On:  1/20/2020 11:59 AM CST    Performed On:  1/20/2020 11:59 AM CST by Jennyfer Stephenson CMA               Depression Screening   Little Interest - Pleasure in Activities :   Not at all   Feeling Down, Depressed, Hopeless :   Not at all   Initial Depression Screen Score :   0    Trouble Falling or Staying Asleep :   Not at all   Feeling Tired or Little Energy :   Not at all   Poor Appetite or Overeating :   Not at all   Feeling Bad About Yourself :   Not at all   Trouble Concentrating :   Not at all   Moving or Speaking Slowly :   Not at all   Thoughts Better Off Dead or Hurting Self :   Not at all   Detailed Depression Screen Score :   0    Total Depression Screen Score :   0    [place] Difficulty with Work, Home, Others :   Not difficult at all   Jennyfer Stephenson CMA - 1/20/2020 11:59 AM CST

## 2022-04-03 ENCOUNTER — HEALTH MAINTENANCE LETTER (OUTPATIENT)
Age: 47
End: 2022-04-03

## 2022-04-04 ENCOUNTER — OFFICE VISIT (OUTPATIENT)
Dept: FAMILY MEDICINE | Facility: CLINIC | Age: 47
End: 2022-04-04
Payer: COMMERCIAL

## 2022-04-04 VITALS
DIASTOLIC BLOOD PRESSURE: 86 MMHG | OXYGEN SATURATION: 98 % | HEART RATE: 76 BPM | TEMPERATURE: 97.6 F | SYSTOLIC BLOOD PRESSURE: 134 MMHG | BODY MASS INDEX: 30.17 KG/M2 | WEIGHT: 178.5 LBS

## 2022-04-04 DIAGNOSIS — M62.830 SPASM OF THORACIC BACK MUSCLE: Primary | ICD-10-CM

## 2022-04-04 PROCEDURE — 99213 OFFICE O/P EST LOW 20 MIN: CPT | Performed by: PHYSICIAN ASSISTANT

## 2022-04-04 RX ORDER — CYCLOBENZAPRINE HCL 10 MG
10 TABLET ORAL 3 TIMES DAILY PRN
Qty: 30 TABLET | Refills: 0 | Status: SHIPPED | OUTPATIENT
Start: 2022-04-04 | End: 2022-11-23

## 2022-04-04 RX ORDER — TRAMADOL HYDROCHLORIDE 50 MG/1
50 TABLET ORAL EVERY 6 HOURS PRN
Qty: 10 TABLET | Refills: 0 | Status: SHIPPED | OUTPATIENT
Start: 2022-04-04 | End: 2022-04-07

## 2022-04-04 ASSESSMENT — ENCOUNTER SYMPTOMS
RESPIRATORY NEGATIVE: 1
BACK PAIN: 1
CONSTITUTIONAL NEGATIVE: 1
GASTROINTESTINAL NEGATIVE: 1
NECK STIFFNESS: 0
NECK PAIN: 0

## 2022-04-04 NOTE — PROGRESS NOTES
Assessment & Plan  Thoracic strain and spasm  Problem List Items Addressed This Visit     None         Flexeril and Tramadol. Stretches. RTC if not improved in one week, or prior if concerns.               No follow-ups on file.    JO Vazquez  [facility]    Subjective   Mau is a 46 year old who presents for the following health issues back pain, spasms    46-year-old male presents to the clinic with a left thoracic back pain  It has been present since Saturday  He denies any specific precipitating event  He has had low back pain previously has not had pain in the thoracic region previously  No fever no chills  No hematuria or urinary symptoms  No cough  No rash has been noted  He has been using ibuprofen and a heat rub    Back Pain     History of Present Illness       Back Pain:  He presents for follow up of back pain. Patient's back pain is a recurring problem.  Location of back pain:  Left middle of back  Description of back pain: sharp  Back pain spreads: nowhere    Since patient first noticed back pain, pain is: gradually worsening  Does back pain interfere with his job:  Yes      He eats 0-1 servings of fruits and vegetables daily.He consumes 0 sweetened beverage(s) daily.He exercises with enough effort to increase his heart rate 9 or less minutes per day.  He exercises with enough effort to increase his heart rate 3 or less days per week.   He is taking medications regularly.       Acute Illness  Acute illness concerns: back pain/spasms  Onset/Duration: the weekend  Symptoms:  Fever: no  Chills/Sweats: no  Headache (location?): no  Sinus Pressure: no  Conjunctivitis:  no  Ear Pain: no  Rhinorrhea: no  Congestion: no  Sore Throat: no  Cough: no  Wheeze: no  Decreased Appetite: no  Nausea: no  Vomiting: no  Diarrhea: no  Dysuria/Freq.: no  Dysuria or Hematuria: no  Fatigue/Achiness: no  Sick/Strep Exposure: no  Therapies tried and outcome: Ibuprofen without  relief      Review of Systems   Constitutional: Negative.    Respiratory: Negative.    Gastrointestinal: Negative.    Genitourinary: Negative.    Musculoskeletal: Positive for back pain. Negative for gait problem, neck pain and neck stiffness.          Objective    /86   Pulse 76   Temp 97.6  F (36.4  C)   Wt 81 kg (178 lb 8 oz)   SpO2 98%   BMI 30.17 kg/m    Body mass index is 30.17 kg/m .  Physical Exam lungs clear well ventilated cardiovascular regular rate and rhythm  He has appreciable visible and palpable spasm left CVA region there is no rash  Forward flexion about 70 degrees before pain limits of movement  Good extension good lateral rotation and bending  Has no focal weakness noted

## 2022-04-04 NOTE — LETTER
April 4, 2022      Mau Moreau  [address] [address]        To Whom It May Concern:    Mau Moreau  was seen on 04/04/2022.  Please excuse him  until 04/06/2022 due to illness.        Sincerely,        JO Vazquez

## 2022-10-03 ENCOUNTER — HEALTH MAINTENANCE LETTER (OUTPATIENT)
Age: 47
End: 2022-10-03

## 2022-11-23 ENCOUNTER — OFFICE VISIT (OUTPATIENT)
Dept: FAMILY MEDICINE | Facility: CLINIC | Age: 47
End: 2022-11-23
Payer: COMMERCIAL

## 2022-11-23 ENCOUNTER — ANCILLARY PROCEDURE (OUTPATIENT)
Dept: GENERAL RADIOLOGY | Facility: CLINIC | Age: 47
End: 2022-11-23
Attending: PHYSICIAN ASSISTANT
Payer: COMMERCIAL

## 2022-11-23 VITALS
RESPIRATION RATE: 16 BRPM | WEIGHT: 175 LBS | TEMPERATURE: 97.6 F | OXYGEN SATURATION: 95 % | DIASTOLIC BLOOD PRESSURE: 88 MMHG | HEART RATE: 75 BPM | SYSTOLIC BLOOD PRESSURE: 138 MMHG | BODY MASS INDEX: 29.57 KG/M2

## 2022-11-23 DIAGNOSIS — M75.52 ACUTE SHOULDER BURSITIS, LEFT: ICD-10-CM

## 2022-11-23 DIAGNOSIS — M75.52 ACUTE SHOULDER BURSITIS, LEFT: Primary | ICD-10-CM

## 2022-11-23 PROCEDURE — 99213 OFFICE O/P EST LOW 20 MIN: CPT | Performed by: PHYSICIAN ASSISTANT

## 2022-11-23 PROCEDURE — 73030 X-RAY EXAM OF SHOULDER: CPT | Mod: TC | Performed by: RADIOLOGY

## 2022-11-23 RX ORDER — PREDNISONE 20 MG/1
20 TABLET ORAL DAILY
Qty: 7 TABLET | Refills: 0 | Status: SHIPPED | OUTPATIENT
Start: 2022-11-23 | End: 2023-01-20

## 2022-11-23 ASSESSMENT — ENCOUNTER SYMPTOMS
NUMBNESS: 0
PARESTHESIAS: 0
NECK STIFFNESS: 0
ACTIVITY CHANGE: 1
ARTHRALGIAS: 1
NECK PAIN: 0
HEADACHES: 0

## 2022-11-23 NOTE — PROGRESS NOTES
Assessment & Plan     Acute shoulder bursitis, left  Has an acute bursitis prednisone not improved he can follow-up and consider possible injection or call for referral to Ortho  - XR Shoulder Left 2 Views  - predniSONE (DELTASONE) 20 MG tablet  Dispense: 7 tablet; Refill: 0                   No follow-ups on file.    JO Vazquez  [facility]    Subjective   Mau is a 47 year old accompanied by his spouse, presenting for the following health issues:  Shoulder Pain (Left. 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  The shoulder has been bothering for about the past 4 weeks he has had no injury he has good range of motion.  He has noticed no weakness.  He is having no neck pain he is not having any numbness or tingling.  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  He is right-hand dominant he has never had problems with his shoulder previously.  Ibuprofen is not effective    Shoulder Pain  Associated symptoms include arthralgias. Pertinent negatives include no headaches, neck pain or numbness.   History of Present Illness       Reason for visit:  Left shoulder pain  Symptom onset:  3-4 weeks ago  Symptoms include:  Soreness  Symptom intensity:  Moderate  Symptom progression:  Staying the same  Had these symptoms before:  No  What makes it worse:  Certain movements    He eats 0-1 servings of fruits and vegetables daily.He consumes 1 sweetened beverage(s) daily.He exercises with enough effort to increase his heart rate 20 to 29 minutes per day.  He exercises with enough effort to increase his heart rate 3 or less days per week.   He is taking medications regularly.             Review of Systems   Constitutional: Positive for activity change.   Musculoskeletal: Positive for arthralgias. Negative for neck pain and neck stiffness.   Neurological: Negative for numbness, headaches and paresthesias.             Objective    /88 (BP Location: Right arm, Patient Position: Sitting)   Pulse 75   Temp 97.6  F (36.4  C) (Tympanic)   Resp 16   Wt 79.4 kg (175 lb)   SpO2 95%   BMI 29.57 kg/m    Body mass index is 29.57 kg/m .  Physical Exam some tenderness right over the superior glenohumeral junction he has full range of motion he has normal strength he has no tenderness with isolation of the supraspinatus he has intact radial pulse he has intact capillary refill intact sensation to light touch distally    Results for orders placed or performed in visit on 11/23/22   XR Shoulder Left 2 Views     Status: None    Narrative    EXAM: XR SHOULDER LEFT 2 VIEWS  LOCATION: [facility]  DATE/TIME: 11/23/2022 12:11 PM    INDICATION:  Acute shoulder bursitis, left  COMPARISON: None.      Impression    IMPRESSION: Normal joint spaces and alignment. No fracture.

## 2023-01-20 ENCOUNTER — OFFICE VISIT (OUTPATIENT)
Dept: FAMILY MEDICINE | Facility: CLINIC | Age: 48
End: 2023-01-20
Payer: COMMERCIAL

## 2023-01-20 VITALS
HEIGHT: 64 IN | HEART RATE: 75 BPM | WEIGHT: 178 LBS | TEMPERATURE: 97.2 F | DIASTOLIC BLOOD PRESSURE: 84 MMHG | SYSTOLIC BLOOD PRESSURE: 126 MMHG | BODY MASS INDEX: 30.39 KG/M2 | RESPIRATION RATE: 18 BRPM | OXYGEN SATURATION: 97 %

## 2023-01-20 DIAGNOSIS — S46.912D MUSCLE STRAIN OF LEFT SHOULDER, SUBSEQUENT ENCOUNTER: Primary | ICD-10-CM

## 2023-01-20 PROCEDURE — 99213 OFFICE O/P EST LOW 20 MIN: CPT | Performed by: PHYSICIAN ASSISTANT

## 2023-01-20 ASSESSMENT — ENCOUNTER SYMPTOMS
MYALGIAS: 1
CONSTITUTIONAL NEGATIVE: 1
PARESTHESIAS: 0
ARTHRALGIAS: 1
JOINT SWELLING: 0
NUMBNESS: 0

## 2023-01-20 NOTE — PROGRESS NOTES
"  Assessment & Plan     Muscle strain of left shoulder, subsequent encounter  We will refer  - Orthopedic  Referral               BMI:   Estimated body mass index is 30.55 kg/m  as calculated from the following:    Height as of this encounter: 1.626 m (5' 4\").    Weight as of this encounter: 80.7 kg (178 lb).           No follow-ups on file.    JO Vazquez  [facility]   Mau is a 47 year old, presenting for the following health issues:  Shoulder Pain (Left Shoulder)      47-year-old parental clinic with persistent recurrent left shoulder pain.  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 Pertinent negatives include no joint swelling or numbness.   History of Present Illness       Reason for visit:  Left shoulder pain    He eats 2-3 servings of fruits and vegetables daily.He consumes 1 sweetened beverage(s) daily.He exercises with enough effort to increase his heart rate 20 to 29 minutes per day.  He exercises with enough effort to increase his heart rate 3 or less days per week.   He is taking medications regularly.             Review of Systems   Constitutional: Negative.    Musculoskeletal: Positive for arthralgias and myalgias. Negative for joint swelling.   Neurological: Negative for numbness and paresthesias.            Objective    /84   Pulse 75   Temp 97.2  F (36.2  C)   Resp 18   Ht 1.626 m (5' 4\")   Wt 80.7 kg (178 lb)   SpO2 97%   BMI 30.55 kg/m    Body mass index is 30.55 kg/m .  Physical Exam  Constitutional:       Appearance: Normal appearance.   Cardiovascular:      Pulses: Normal pulses. "   Musculoskeletal:         General: Tenderness present. No swelling or deformity.      Comments: Shoulder he has some tenderness anterior superiorly then able to lift his hand off his back his range of motion is full but with pain at extremes of internal rotation strength appears to be well-maintained he has some tenderness with the empty can test   Neurological:      Mental Status: He is alert.

## 2023-02-14 ENCOUNTER — TRANSFERRED RECORDS (OUTPATIENT)
Dept: HEALTH INFORMATION MANAGEMENT | Facility: CLINIC | Age: 48
End: 2023-02-14

## 2023-05-20 ENCOUNTER — HEALTH MAINTENANCE LETTER (OUTPATIENT)
Age: 48
End: 2023-05-20

## 2024-06-17 ENCOUNTER — OFFICE VISIT (OUTPATIENT)
Dept: FAMILY MEDICINE | Facility: CLINIC | Age: 49
End: 2024-06-17
Payer: COMMERCIAL

## 2024-06-17 VITALS
DIASTOLIC BLOOD PRESSURE: 82 MMHG | WEIGHT: 157.5 LBS | TEMPERATURE: 97.7 F | HEART RATE: 86 BPM | HEIGHT: 65 IN | BODY MASS INDEX: 26.24 KG/M2 | OXYGEN SATURATION: 98 % | RESPIRATION RATE: 16 BRPM | SYSTOLIC BLOOD PRESSURE: 128 MMHG

## 2024-06-17 DIAGNOSIS — Z13.6 SCREENING FOR CARDIOVASCULAR CONDITION: ICD-10-CM

## 2024-06-17 DIAGNOSIS — Z00.00 ANNUAL PHYSICAL EXAM: ICD-10-CM

## 2024-06-17 DIAGNOSIS — R73.02 IGT (IMPAIRED GLUCOSE TOLERANCE): Primary | ICD-10-CM

## 2024-06-17 DIAGNOSIS — Z12.11 SCREEN FOR COLON CANCER: Primary | ICD-10-CM

## 2024-06-17 LAB
CHOLEST SERPL-MCNC: 200 MG/DL
FASTING STATUS PATIENT QL REPORTED: YES
FASTING STATUS PATIENT QL REPORTED: YES
GLUCOSE SERPL-MCNC: 125 MG/DL (ref 70–99)
HDLC SERPL-MCNC: 75 MG/DL
LDLC SERPL CALC-MCNC: 112 MG/DL
NONHDLC SERPL-MCNC: 125 MG/DL
TRIGL SERPL-MCNC: 67 MG/DL

## 2024-06-17 PROCEDURE — 36415 COLL VENOUS BLD VENIPUNCTURE: CPT | Performed by: PHYSICIAN ASSISTANT

## 2024-06-17 PROCEDURE — 82947 ASSAY GLUCOSE BLOOD QUANT: CPT | Mod: QW | Performed by: PHYSICIAN ASSISTANT

## 2024-06-17 PROCEDURE — 99396 PREV VISIT EST AGE 40-64: CPT | Performed by: PHYSICIAN ASSISTANT

## 2024-06-17 PROCEDURE — 80061 LIPID PANEL: CPT | Performed by: PHYSICIAN ASSISTANT

## 2024-06-17 SDOH — HEALTH STABILITY: PHYSICAL HEALTH: ON AVERAGE, HOW MANY MINUTES DO YOU ENGAGE IN EXERCISE AT THIS LEVEL?: 20 MIN

## 2024-06-17 SDOH — HEALTH STABILITY: PHYSICAL HEALTH: ON AVERAGE, HOW MANY DAYS PER WEEK DO YOU ENGAGE IN MODERATE TO STRENUOUS EXERCISE (LIKE A BRISK WALK)?: 2 DAYS

## 2024-06-17 ASSESSMENT — SOCIAL DETERMINANTS OF HEALTH (SDOH): HOW OFTEN DO YOU GET TOGETHER WITH FRIENDS OR RELATIVES?: ONCE A WEEK

## 2024-06-17 NOTE — PROGRESS NOTES
"Preventive Care Visit  [facility]  JO Vazquez, Family Medicine  Jun 17, 2024      Assessment & Plan     (Z12.11) Screen for colon cancer  (primary encounter diagnosis)  Comment: Family history of colon cancer  Plan: Colonoscopy Screening  Referral        Ordered colonoscopy    (Z00.00) Annual physical exam  Comment: Healthy male  Plan: Return in 1 year    (Z13.6) Screening for cardiovascular condition  Comment: Request screening  Plan: Glucose, Lipid panel reflex to direct LDL         Fasting        Labs pending            BMI  Estimated body mass index is 26.21 kg/m  as calculated from the following:    Height as of this encounter: 1.651 m (5' 5\").    Weight as of this encounter: 71.4 kg (157 lb 8 oz).       Counseling  Appropriate preventive services were discussed with this patient, including applicable screening as appropriate for fall prevention, nutrition, physical activity, Tobacco-use cessation, weight loss and cognition.  Checklist reviewing preventive services available has been given to the patient.  Reviewed patient's diet, addressing concerns and/or questions.   He is at risk for lack of exercise and has been provided with information to increase physical activity for the benefit of his well-being.   The patient was instructed to see the dentist every 6 months.   The patient reports drinking more than 3 alcoholic drinks per day and/or more than 7 drhnks per week. The patient was counseled and given information about possible harmful effects of excessive alcohol intake.        Debbie León is a 48 year old, presenting for the following:  Physical        6/17/2024     9:00 AM   Additional Questions   Roomed by TIERA Hollingsworth        Health Care Directive  Patient does not have a Health Care Directive or Living Will:     48-year-old here for annual exam  Mother age 68 recently diagnosed with colon cancer he would like to be screened  He has no symptoms  Has not " been screened prior  Alcohol screen is positive but he states not a concern and wishes no further intervention  He otherwise feels well with no complaints or concerns work is going well Home is going well                  6/17/2024   General Health   How would you rate your overall physical health? Good   Feel stress (tense, anxious, or unable to sleep) To some extent   (!) STRESS CONCERN      6/17/2024   Nutrition   Three or more servings of calcium each day? (!) NO   Diet: Regular (no restrictions)   How many servings of fruit and vegetables per day? (!) 0-1   How many sweetened beverages each day? 0-1         6/17/2024   Exercise   Days per week of moderate/strenous exercise 2 days   Average minutes spent exercising at this level 20 min   (!) EXERCISE CONCERN      6/17/2024   Social Factors   Frequency of gathering with friends or relatives Once a week   Worry food won't last until get money to buy more No   Food not last or not have enough money for food? No   Do you have housing?  Yes   Are you worried about losing your housing? No   Lack of transportation? No   Unable to get utilities (heat,electricity)? No         6/17/2024   Dental   Dentist two times every year? (!) NO         6/17/2024   TB Screening   Were you born outside of the [place]? No         Today's PHQ-2 Score:       6/17/2024     5:38 AM   PHQ-2 ( 1999 [employer])   Q1: Little interest or pleasure in doing things 0   Q2: Feeling down, depressed or hopeless 0   PHQ-2 Score 0   Q1: Little interest or pleasure in doing things Not at all   Q2: Feeling down, depressed or hopeless Not at all   PHQ-2 Score 0           6/17/2024   Substance Use   Alcohol more than 3/day or more than 7/wk Yes   How often do you have a drink containing alcohol 4 or more times a week   How many alcohol drinks on typical day 3 or 4   How often do you have 5+ drinks at one occasion Weekly   Audit 2/3 Score 4   How often not able to stop drinking once started Never   How often failed  "to do what normally expected Never   How often needed first drink in am after a heavy drinking session Never   How often feeling of guilt or remorse after drinking Never   How often unable to remember what happened the night before Less than monthly   Have you or someone else been injured because of your drinking No   Has anyone been concerned or suggested you cut down on drinking No   TOTAL SCORE - AUDIT 9   Do you use any other substances recreationally? (!) CANNABIS PRODUCTS     Social History     Tobacco Use    Smoking status: Former     Types: Cigarettes     Passive exposure: Past    Smokeless tobacco: Never   Vaping Use    Vaping status: Never Used             6/17/2024   One time HIV Screening   Previous HIV test? No         6/17/2024   STI Screening   New sexual partner(s) since last STI/HIV test? No   ASCVD Risk   The 10-year ASCVD risk score (Radha BARTHOLOMEW, et al., 2019) is: 4.3%*    Values used to calculate the score:      Age: 48 years      Sex: Male      Is Non- : No      Diabetic: No      Tobacco smoker: No      Systolic Blood Pressure: 128 mmHg      Is BP treated: No      HDL Cholesterol: 43 mg/dL      Total Cholesterol: 6.1 mmol/L*      * - Cholesterol units were assumed for this score calculation        6/17/2024   Contraception/Family Planning   Questions about contraception or family planning No        Reviewed and updated as needed this visit by Provider                          Review of Systems  CONSTITUTIONAL: NEGATIVE for fever, chills, change in weight  ENT/MOUTH: NEGATIVE for ear, mouth and throat problems  RESP: NEGATIVE for significant cough or SOB  CV: NEGATIVE for chest pain, palpitations or peripheral edema     Objective    Exam  /82 (BP Location: Right arm, Patient Position: Sitting, Cuff Size: Adult Regular)   Pulse 86   Temp 97.7  F (36.5  C) (Tympanic)   Resp 16   Ht 1.651 m (5' 5\")   Wt 71.4 kg (157 lb 8 oz)   SpO2 98%   BMI 26.21 kg/m   " "  Estimated body mass index is 26.21 kg/m  as calculated from the following:    Height as of this encounter: 1.651 m (5' 5\").    Weight as of this encounter: 71.4 kg (157 lb 8 oz).    Physical Exam  Vitals and nursing note reviewed.   Constitutional:       Appearance: Normal appearance.   HENT:      Head: Normocephalic and atraumatic.      Right Ear: Tympanic membrane normal.      Left Ear: Tympanic membrane normal.      Nose: Nose normal. No congestion or rhinorrhea.      Mouth/Throat:      Mouth: Mucous membranes are moist.   Eyes:      Conjunctiva/sclera: Conjunctivae normal.   Cardiovascular:      Rate and Rhythm: Normal rate and regular rhythm.      Heart sounds: Normal heart sounds.   Pulmonary:      Effort: Pulmonary effort is normal.      Breath sounds: Normal breath sounds.   Abdominal:      General: Abdomen is flat. Bowel sounds are normal.      Palpations: There is no mass.      Tenderness: There is no abdominal tenderness.   Musculoskeletal:         General: Normal range of motion.      Cervical back: Normal range of motion and neck supple.      Right lower leg: No edema.      Left lower leg: No edema.   Lymphadenopathy:      Cervical: No cervical adenopathy.   Skin:     General: Skin is warm and dry.   Neurological:      General: No focal deficit present.   Psychiatric:         Mood and Affect: Mood normal.         Behavior: Behavior normal.         Thought Content: Thought content normal.         Judgment: Judgment normal.               Signed Electronically by: JO Vazquez    "

## 2025-01-20 ENCOUNTER — OFFICE VISIT (OUTPATIENT)
Dept: FAMILY MEDICINE | Facility: CLINIC | Age: 50
End: 2025-01-20
Payer: COMMERCIAL

## 2025-01-20 VITALS
WEIGHT: 160.4 LBS | BODY MASS INDEX: 26.73 KG/M2 | OXYGEN SATURATION: 99 % | HEART RATE: 72 BPM | SYSTOLIC BLOOD PRESSURE: 158 MMHG | RESPIRATION RATE: 16 BRPM | HEIGHT: 65 IN | TEMPERATURE: 97.5 F | DIASTOLIC BLOOD PRESSURE: 98 MMHG

## 2025-01-20 DIAGNOSIS — M77.12 LEFT LATERAL EPICONDYLITIS: ICD-10-CM

## 2025-01-20 DIAGNOSIS — R73.02 IGT (IMPAIRED GLUCOSE TOLERANCE): ICD-10-CM

## 2025-01-20 DIAGNOSIS — Z00.00 ANNUAL PHYSICAL EXAM: ICD-10-CM

## 2025-01-20 DIAGNOSIS — Z13.6 SCREENING FOR CARDIOVASCULAR CONDITION: Primary | ICD-10-CM

## 2025-01-20 LAB
EST. AVERAGE GLUCOSE BLD GHB EST-MCNC: 111 MG/DL
HBA1C MFR BLD: 5.5 % (ref 0–5.6)

## 2025-01-20 PROCEDURE — 83036 HEMOGLOBIN GLYCOSYLATED A1C: CPT | Performed by: PHYSICIAN ASSISTANT

## 2025-01-20 PROCEDURE — 99213 OFFICE O/P EST LOW 20 MIN: CPT | Mod: 25 | Performed by: PHYSICIAN ASSISTANT

## 2025-01-20 PROCEDURE — 80048 BASIC METABOLIC PNL TOTAL CA: CPT | Performed by: PHYSICIAN ASSISTANT

## 2025-01-20 PROCEDURE — 99396 PREV VISIT EST AGE 40-64: CPT | Performed by: PHYSICIAN ASSISTANT

## 2025-01-20 PROCEDURE — 80061 LIPID PANEL: CPT | Performed by: PHYSICIAN ASSISTANT

## 2025-01-20 PROCEDURE — 36415 COLL VENOUS BLD VENIPUNCTURE: CPT | Performed by: PHYSICIAN ASSISTANT

## 2025-01-20 PROCEDURE — G2211 COMPLEX E/M VISIT ADD ON: HCPCS | Performed by: PHYSICIAN ASSISTANT

## 2025-01-20 SDOH — HEALTH STABILITY: PHYSICAL HEALTH: ON AVERAGE, HOW MANY MINUTES DO YOU ENGAGE IN EXERCISE AT THIS LEVEL?: 20 MIN

## 2025-01-20 SDOH — HEALTH STABILITY: PHYSICAL HEALTH: ON AVERAGE, HOW MANY DAYS PER WEEK DO YOU ENGAGE IN MODERATE TO STRENUOUS EXERCISE (LIKE A BRISK WALK)?: 5 DAYS

## 2025-01-20 ASSESSMENT — SOCIAL DETERMINANTS OF HEALTH (SDOH): HOW OFTEN DO YOU GET TOGETHER WITH FRIENDS OR RELATIVES?: ONCE A WEEK

## 2025-01-20 NOTE — PROGRESS NOTES
"Preventive Care Visit  [facility]  JO Vazquez, Family Medicine  Jan 20, 2025      Assessment & Plan     (Z13.6) Screening for cardiovascular condition  (primary encounter diagnosis)  Comment:   Plan: Lipid panel reflex to direct LDL Fasting, Basic        metabolic panel  (Ca, Cl, CO2, Creat, Gluc, K,         Na, BUN)        Elevated initial and repeated BP readings in office today. Discussed measuring BP at home or coming into the clinic at least 3 times over the next weeks to continue to monitor. If BP remains elevated, will discuss lifestyle changes and possibly the initiation of an anti-hypertensive medication. The patient agrees with this plan    Results for orders placed or performed in visit on 01/20/25   Hemoglobin A1c     Status: Normal   Result Value Ref Range    Estimated Average Glucose 111 <117 mg/dL    Hemoglobin A1C 5.5 0.0 - 5.6 %         (Z00.00) Annual physical exam  Comment:   Plan: Follow up in 1 year     (R73.02) IGT (impaired glucose tolerance)  Comment: Stable   Plan: Hemoglobin A1c        Elevated Hgb at previous lab draw in June of 2024. Pending A1C results     (M77.12) Left lateral epicondylitis  Comment: Persistent   Plan: Discussed the overuse nature of this condition. Recommended ibuprofen and ice prn for the pain. Follow up sooner if worsening or not resolved     Patient has been advised of split billing requirements and indicates understanding: Yes        BMI  Estimated body mass index is 26.69 kg/m  as calculated from the following:    Height as of this encounter: 1.651 m (5' 5\").    Weight as of this encounter: 72.8 kg (160 lb 6.4 oz).       Counseling  Appropriate preventive services were addressed with this patient via screening, questionnaire, or discussion as appropriate for fall prevention, nutrition, physical activity, Tobacco-use cessation, social engagement, weight loss and cognition.  Checklist reviewing preventive services available has " been given to the patient.  Reviewed patient's diet, addressing concerns and/or questions.   The patient was instructed to see the dentist every 6 months.   The patient reports drinking more than 3 alcoholic drinks per day and/or more than 7 drhnks per week. The patient was counseled and given information about possible harmful effects of excessive alcohol intake.        Debbie León is a 49 year old, presenting for the following:  Physical        1/20/2025     3:30 PM   Additional Questions   Roomed by TIERA Hollingsworth          Patient presents for an annual wellness exam  He is not currently taking any medications   His BP in office is elevated today at 160/100 mmHg   He denies monitoring his BP at home   He denies having a family history of HTN or CVD  He denies having any recent fever, CP, SOB, or HA     Additionally, he complains of a few episodes of left elbow pain  Pain occurs particularly when he is making a fist with his elbow extended or when he does a pincer grasp while picking up something heavy   He denies having any pain when his elbow is flexed   Pain typically lasts seconds to minutes   He denies trying anything for the pain   Patient does not play tennis or golf  He is quite active with his hands at work  Patient is right handed   He denies having any trauma to the area    Scribed by JUANITA Mena                 Health Care Directive  Patient does not have a Health Care Directive:       1/20/2025   General Health   How would you rate your overall physical health? Good   Feel stress (tense, anxious, or unable to sleep) Only a little   (!) STRESS CONCERN      1/20/2025   Nutrition   Three or more servings of calcium each day? Yes   Diet: Regular (no restrictions)   How many servings of fruit and vegetables per day? (!) 2-3   How many sweetened beverages each day? 0-1         1/20/2025   Exercise   Days per week of moderate/strenous exercise 5 days   Average minutes spent exercising at this  level 20 min         1/20/2025   Social Factors   Frequency of gathering with friends or relatives Once a week   Worry food won't last until get money to buy more No   Food not last or not have enough money for food? No   Do you have housing? (Housing is defined as stable permanent housing and does not include staying ouside in a car, in a tent, in an abandoned building, in an overnight shelter, or couch-surfing.) Yes   Are you worried about losing your housing? No   Lack of transportation? No   Unable to get utilities (heat,electricity)? No         1/20/2025   Dental   Dentist two times every year? (!) NO         6/17/2024   TB Screening   Were you born outside of the [place]? No         Today's PHQ-2 Score:       1/20/2025    10:13 AM   PHQ-2 ( 1999 [employer])   Q1: Little interest or pleasure in doing things 0   Q2: Feeling down, depressed or hopeless 0   PHQ-2 Score 0    Q1: Little interest or pleasure in doing things Not at all   Q2: Feeling down, depressed or hopeless Not at all   PHQ-2 Score 0       Patient-reported           1/20/2025   Substance Use   Alcohol more than 3/day or more than 7/wk Yes   How often do you have a drink containing alcohol 4 or more times a week   How many alcohol drinks on typical day 3 or 4   How often do you have 5+ drinks at one occasion Weekly   Audit 2/3 Score 4   How often not able to stop drinking once started Never   How often failed to do what normally expected Never   How often needed first drink in am after a heavy drinking session Never   How often feeling of guilt or remorse after drinking Never   How often unable to remember what happened the night before Never   Have you or someone else been injured because of your drinking No   Has anyone been concerned or suggested you cut down on drinking No   TOTAL SCORE - AUDIT 8   Do you use any other substances recreationally? No     Social History     Tobacco Use    Smoking status: Former     Types: Cigarettes     Passive exposure:  "Past    Smokeless tobacco: Never   Vaping Use    Vaping status: Never Used             1/20/2025   One time HIV Screening   Previous HIV test? No         1/20/2025   STI Screening   New sexual partner(s) since last STI/HIV test? No   ASCVD Risk   The 10-year ASCVD risk score (Radha BARTHOLOMEW, et al., 2019) is: 3%    Values used to calculate the score:      Age: 49 years      Sex: Male      Is Non- : No      Diabetic: No      Tobacco smoker: No      Systolic Blood Pressure: 160 mmHg      Is BP treated: No      HDL Cholesterol: 75 mg/dL      Total Cholesterol: 200 mg/dL        1/20/2025   Contraception/Family Planning   Questions about contraception or family planning No        Reviewed and updated as needed this visit by Provider                             Objective    Exam  BP (!) 160/100 (BP Location: Right arm, Patient Position: Sitting, Cuff Size: Adult Regular)   Pulse 72   Temp 97.5  F (36.4  C) (Tympanic)   Resp 16   Ht 1.651 m (5' 5\")   Wt 72.8 kg (160 lb 6.4 oz)   SpO2 99%   BMI 26.69 kg/m     Estimated body mass index is 26.69 kg/m  as calculated from the following:    Height as of this encounter: 1.651 m (5' 5\").    Weight as of this encounter: 72.8 kg (160 lb 6.4 oz).    Physical Exam  Constitutional:       Appearance: Normal appearance.   HENT:      Head: Normocephalic and atraumatic.      Right Ear: Tympanic membrane normal.      Left Ear: Tympanic membrane normal.      Mouth/Throat:      Mouth: Mucous membranes are moist.      Pharynx: Oropharynx is clear.   Eyes:      Extraocular Movements: Extraocular movements intact.      Conjunctiva/sclera: Conjunctivae normal.      Pupils: Pupils are equal, round, and reactive to light.   Cardiovascular:      Rate and Rhythm: Normal rate and regular rhythm.      Pulses: Normal pulses.      Heart sounds: Normal heart sounds.   Pulmonary:      Effort: Pulmonary effort is normal.      Breath sounds: Normal breath sounds. "   Abdominal:      General: Abdomen is flat. Bowel sounds are normal.      Palpations: Abdomen is soft.   Musculoskeletal:         General: Normal range of motion.      Cervical back: Normal range of motion and neck supple.      Comments: There is minimal tenderness to palpation over the left lateral epicondyle. Pain with resisted pronation of the left arm. Pain elicited with extension of the left elbow with wrist extension. There are no skin changes or visible deformities. Strength and sensation were intact   Skin:     General: Skin is warm and dry.      Capillary Refill: Capillary refill takes less than 2 seconds.   Neurological:      General: No focal deficit present.      Mental Status: He is alert and oriented to person, place, and time.   Psychiatric:         Mood and Affect: Mood normal.           Note composed by JUANITA Mena. History and exam performed and confirmed by me. Agree with assessment and plan.      Signed Electronically by: JO Vazquez

## 2025-01-21 LAB
ANION GAP SERPL CALCULATED.3IONS-SCNC: 16 MMOL/L (ref 7–15)
BUN SERPL-MCNC: 10.5 MG/DL (ref 6–20)
CALCIUM SERPL-MCNC: 7.9 MG/DL (ref 8.8–10.4)
CHLORIDE SERPL-SCNC: 96 MMOL/L (ref 98–107)
CHOLEST SERPL-MCNC: 174 MG/DL
CREAT SERPL-MCNC: 0.97 MG/DL (ref 0.67–1.17)
EGFRCR SERPLBLD CKD-EPI 2021: >90 ML/MIN/1.73M2
FASTING STATUS PATIENT QL REPORTED: YES
FASTING STATUS PATIENT QL REPORTED: YES
GLUCOSE SERPL-MCNC: 84 MG/DL (ref 70–99)
HCO3 SERPL-SCNC: 19 MMOL/L (ref 22–29)
HDLC SERPL-MCNC: 54 MG/DL
LDLC SERPL CALC-MCNC: 108 MG/DL
NONHDLC SERPL-MCNC: 120 MG/DL
POTASSIUM SERPL-SCNC: 4.4 MMOL/L (ref 3.4–5.3)
SODIUM SERPL-SCNC: 131 MMOL/L (ref 135–145)
TRIGL SERPL-MCNC: 60 MG/DL

## 2025-01-30 ENCOUNTER — OFFICE VISIT (OUTPATIENT)
Dept: FAMILY MEDICINE | Facility: CLINIC | Age: 50
End: 2025-01-30
Payer: COMMERCIAL

## 2025-01-30 VITALS
WEIGHT: 164 LBS | RESPIRATION RATE: 16 BRPM | HEIGHT: 65 IN | DIASTOLIC BLOOD PRESSURE: 99 MMHG | TEMPERATURE: 97.4 F | BODY MASS INDEX: 27.32 KG/M2 | SYSTOLIC BLOOD PRESSURE: 183 MMHG | OXYGEN SATURATION: 100 % | HEART RATE: 64 BPM

## 2025-01-30 DIAGNOSIS — E87.1 HYPONATREMIA: ICD-10-CM

## 2025-01-30 DIAGNOSIS — E88.09 HYPOALBUMINEMIA: Primary | ICD-10-CM

## 2025-01-30 DIAGNOSIS — E83.51 HYPOCALCEMIA: ICD-10-CM

## 2025-01-30 DIAGNOSIS — I10 BENIGN ESSENTIAL HYPERTENSION: ICD-10-CM

## 2025-01-30 LAB
OSMOLALITY UR: 136 MMOL/KG (ref 100–1200)
SODIUM UR-SCNC: 21 MMOL/L

## 2025-01-30 NOTE — PROGRESS NOTES
Assessment & Plan   Problem List Items Addressed This Visit          Endocrine    Hypocalcemia     1/2025: Mildly low total calcium 7.9 -no recent comparator  -Checking albumin, magnesium         Relevant Orders    Sodium random urine    Osmolality urine    Basic metabolic panel    Albumin level    Magnesium       Circulatory    Benign essential hypertension     Uncontrolled -notable increase in in-clinic blood pressures this month; historically well-controlled blood pressures based on in-clinic readings  current antihypertensive regimen:   regimen changes:   intolerance:   future titration/work-up plan:  -Plans for him to come in serially for nurses blood pressure checks here in clinic encouraged him to obtain a home blood pressure cuff as well  -Discussed lifestyle modification -generally no obvious remediable sources other than his alcohol consumption.  Previous use of tobacco use which she quit approximately 10 years ago  -Defer any antihypertensive medication introduction for now.  If however seeing persistent rise in in-clinic numbers over the next few months, I do anticipate antihypertensive medication introduction at that time            Other    Hyponatremia     1/2025: Na 131 -1 isolated occasion of low sodium  -Patient shares drawn in context of prolonged fasting for nearly 24 hours for afternoon based lab draw   -Very possible this could be related to an iatrogenic hypovolemia  -Also question of possible hypoosmolar hyponatremia given his significant beer consumption  -Rechecking sodium levels along with a urine sodium and urine osmolality         Relevant Orders    Sodium random urine    Osmolality urine    Basic metabolic panel    Albumin level    Magnesium     Other Visit Diagnoses       Hypoalbuminemia    -  Primary    Relevant Orders    Sodium random urine    Osmolality urine    Basic metabolic panel    Albumin level    Magnesium             30 minutes spent by me on the date of the encounter  "doing review of test results, interpretation of tests, patient visit, and documentation        BMI  Estimated body mass index is 27.29 kg/m  as calculated from the following:    Height as of this encounter: 1.651 m (5' 5\").    Weight as of this encounter: 74.4 kg (164 lb).       FUTURE APPOINTMENTS:       - Follow-up visit in-clinic RN bp rechecks q3-4 weeks for next several months      Debbie León is a 49 year old, presenting for the following health issues: Referred to me by [facility] for accelerated high blood pressure in the context of recent hyponatremia, hypochloremia.  He shares that his recent labs this past week were after a prolonged fast.  States he had been fasting for nearly 24 hours for an afternoon appointment.  Presenting for annual Px - no voiced complaints at that visit.  Not maintained on any medication.  He shares drinking beer on most evenings -describes 2-4 drinks on average.  No uses of any hard alcohol.  Does not identify alcohol as a source of problem or sees any need to cut back.  Consult (Per [facility] for low sodium,calcium and chloride levels from 1/20/25)      1/30/2025     4:47 PM   Additional Questions   Roomed by Ant MOTT     History of Present Illness       Reason for visit:  Doctors requested   He is taking medications regularly.           Review of Systems  Constitutional, HEENT, cardiovascular, pulmonary, gi and gu systems are negative, except as otherwise noted.      Objective    BP (!) 184/114 (BP Location: Right arm, Patient Position: Sitting, Cuff Size: Adult Large)   Pulse 64   Temp 97.4  F (36.3  C) (Tympanic)   Resp 16   Ht 1.651 m (5' 5\")   Wt 74.4 kg (164 lb)   SpO2 100%   BMI 27.29 kg/m    Body mass index is 27.29 kg/m .  Physical Exam   GENERAL: alert and no distress  NECK: no adenopathy, no asymmetry, masses, or scars  RESP: lungs clear to auscultation - no rales, rhonchi or wheezes  CV: regular rate and rhythm, normal S1 S2, no S3 or S4, no murmur, click or rub, no " peripheral edema  ABDOMEN: soft, nontender, no hepatosplenomegaly, no masses and bowel sounds normal  MS: no gross musculoskeletal defects noted, no edema    Office Visit on 01/20/2025   Component Date Value Ref Range Status    Cholesterol 01/20/2025 174  <200 mg/dL Final    Triglycerides 01/20/2025 60  <150 mg/dL Final    Direct Measure HDL 01/20/2025 54  >=40 mg/dL Final    LDL Cholesterol Calculated 01/20/2025 108 (H)  <100 mg/dL Final    Non HDL Cholesterol 01/20/2025 120  <130 mg/dL Final    Patient Fasting > 8hrs? 01/20/2025 Yes   Final    Sodium 01/20/2025 131 (L)  135 - 145 mmol/L Final    Potassium 01/20/2025 4.4  3.4 - 5.3 mmol/L Final    Chloride 01/20/2025 96 (L)  98 - 107 mmol/L Final    Carbon Dioxide (CO2) 01/20/2025 19 (L)  22 - 29 mmol/L Final    Anion Gap 01/20/2025 16 (H)  7 - 15 mmol/L Final    Urea Nitrogen 01/20/2025 10.5  6.0 - 20.0 mg/dL Final    Creatinine 01/20/2025 0.97  0.67 - 1.17 mg/dL Final    GFR Estimate 01/20/2025 >90  >60 mL/min/1.73m2 Final    eGFR calculated using 2021 CKD-EPI equation.    Calcium 01/20/2025 7.9 (L)  8.8 - 10.4 mg/dL Final    Reference intervals for this test were updated on 7/16/2024 to reflect our healthy population more accurately. There may be differences in the flagging of prior results with similar values performed with this method. Those prior results can be interpreted in the context of the updated reference intervals.    Glucose 01/20/2025 84  70 - 99 mg/dL Final    Patient Fasting > 8hrs? 01/20/2025 Yes   Final    Estimated Average Glucose 01/20/2025 111  <117 mg/dL Final    Hemoglobin A1C 01/20/2025 5.5  0.0 - 5.6 % Final    Normal <5.7%   Prediabetes 5.7-6.4%    Diabetes 6.5% or higher     Note: Adopted from ADA consensus guidelines.           Signed Electronically by: Oswaldo Plasencia MD

## 2025-01-31 NOTE — ASSESSMENT & PLAN NOTE
Uncontrolled -notable increase in in-clinic blood pressures this month; historically well-controlled blood pressures based on in-clinic readings  current antihypertensive regimen:   regimen changes:   intolerance:   future titration/work-up plan:  -Plans for him to come in serially for nurses blood pressure checks here in clinic encouraged him to obtain a home blood pressure cuff as well  -Discussed lifestyle modification -generally no obvious remediable sources other than his alcohol consumption.  Previous use of tobacco use which she quit approximately 10 years ago  -Defer any antihypertensive medication introduction for now.  If however seeing persistent rise in in-clinic numbers over the next few months, I do anticipate antihypertensive medication introduction at that time

## 2025-01-31 NOTE — ASSESSMENT & PLAN NOTE
1/2025: Na 131 -1 isolated occasion of low sodium  -Patient shares drawn in context of prolonged fasting for nearly 24 hours for afternoon based lab draw   -Very possible this could be related to an iatrogenic hypovolemia  -Also question of possible hypoosmolar hyponatremia given his significant beer consumption  -Rechecking sodium levels along with a urine sodium and urine osmolality

## 2025-02-13 ENCOUNTER — ALLIED HEALTH/NURSE VISIT (OUTPATIENT)
Dept: FAMILY MEDICINE | Facility: CLINIC | Age: 50
End: 2025-02-13
Payer: COMMERCIAL

## 2025-02-13 VITALS — OXYGEN SATURATION: 98 % | SYSTOLIC BLOOD PRESSURE: 155 MMHG | HEART RATE: 76 BPM | DIASTOLIC BLOOD PRESSURE: 100 MMHG

## 2025-02-13 DIAGNOSIS — Z01.30 BLOOD PRESSURE CHECK: Primary | ICD-10-CM

## 2025-02-13 NOTE — PROGRESS NOTES
I met with Mau Moreau at the request of MAU OBREGON PA-C  to recheck his blood pressure.  Blood pressure medications on the med list were reviewed with patient.    Patient has taken all medications as per usual regimen: NA  Patient reports tolerating them without any issues or concerns: NA    Vitals:    02/13/25 1534 02/13/25 1540   BP: (!) 163/93 (!) 155/100   Pulse: 80 76   SpO2: 99% 98%       After 5 minutes, the patient's blood pressure remained greater than or equal to 140/90.    Is the patient currently having any chest pain? No  Does the patient currently have a headache? No  Does the patient currently have any vision changes? No  Does the patient currently have any nausea? No  Does the patient currently have any abdominal pain? No    The previous encounter was reviewed.  The patient was discharged and the note will be sent to the provider for final review.

## 2025-02-27 ENCOUNTER — ALLIED HEALTH/NURSE VISIT (OUTPATIENT)
Dept: FAMILY MEDICINE | Facility: CLINIC | Age: 50
End: 2025-02-27
Payer: COMMERCIAL

## 2025-02-27 VITALS — HEART RATE: 83 BPM | SYSTOLIC BLOOD PRESSURE: 136 MMHG | DIASTOLIC BLOOD PRESSURE: 88 MMHG | OXYGEN SATURATION: 98 %

## 2025-02-27 DIAGNOSIS — Z01.30 BLOOD PRESSURE CHECK: Primary | ICD-10-CM

## 2025-02-27 NOTE — PROGRESS NOTES
I met with Mau Moreau at the request of MAU OBREGON PA-C  to recheck his blood pressure.  Blood pressure medications on the med list were reviewed with patient.    Patient has taken all medications as per usual regimen: Yes  Patient reports tolerating them without any issues or concerns: Yes    Vitals:    02/27/25 1532 02/27/25 1538   BP: (!) 155/91 136/88   Pulse: 74 83   SpO2: 99% 98%       After 5 minutes, the patient's blood pressure was <140/90, the previous encounter was reviewed, recorded blood pressure below 140/90. Patient was discharged and the note will be sent to the provider for final review.

## 2025-02-28 ENCOUNTER — TELEPHONE (OUTPATIENT)
Dept: FAMILY MEDICINE | Facility: CLINIC | Age: 50
End: 2025-02-28
Payer: COMMERCIAL

## 2025-02-28 NOTE — TELEPHONE ENCOUNTER
I called and left voicemail for patient, if/when patient calls back please inquire at home blood pressure readings. If needed, please transfer call to clinical staff.

## 2025-03-01 DIAGNOSIS — I10 ESSENTIAL HYPERTENSION: Primary | ICD-10-CM

## 2025-03-01 RX ORDER — AMLODIPINE AND VALSARTAN 5; 160 MG/1; MG/1
1 TABLET ORAL DAILY
Qty: 30 TABLET | Refills: 0 | Status: SHIPPED | OUTPATIENT
Start: 2025-03-01

## 2025-03-27 ENCOUNTER — ALLIED HEALTH/NURSE VISIT (OUTPATIENT)
Dept: FAMILY MEDICINE | Facility: CLINIC | Age: 50
End: 2025-03-27
Payer: COMMERCIAL

## 2025-03-27 VITALS — DIASTOLIC BLOOD PRESSURE: 82 MMHG | OXYGEN SATURATION: 99 % | HEART RATE: 70 BPM | SYSTOLIC BLOOD PRESSURE: 123 MMHG

## 2025-03-27 DIAGNOSIS — Z01.30 BLOOD PRESSURE CHECK: Primary | ICD-10-CM

## 2025-03-27 NOTE — PROGRESS NOTES
I met with Mau Moreau at the request of MAU OBREGON PA-C  to recheck his blood pressure.  Blood pressure medications on the med list were reviewed with patient.    Patient has taken all medications as per usual regimen: Yes  Patient reports tolerating them without any issues or concerns: Yes    Vitals:    03/27/25 1001   BP: 123/82   Pulse: 70   SpO2: 99%       Blood pressure was taken, previous encounter was reviewed, recorded blood pressure below 140/90.  Patient was discharged and the note will be sent to the provider for final review.